# Patient Record
Sex: MALE | Race: WHITE | NOT HISPANIC OR LATINO | Employment: FULL TIME | URBAN - NONMETROPOLITAN AREA
[De-identification: names, ages, dates, MRNs, and addresses within clinical notes are randomized per-mention and may not be internally consistent; named-entity substitution may affect disease eponyms.]

---

## 2024-09-08 ENCOUNTER — HOSPITAL ENCOUNTER (EMERGENCY)
Facility: HOSPITAL | Age: 59
Discharge: HOME | End: 2024-09-08
Attending: FAMILY MEDICINE
Payer: COMMERCIAL

## 2024-09-08 ENCOUNTER — APPOINTMENT (OUTPATIENT)
Dept: RADIOLOGY | Facility: HOSPITAL | Age: 59
End: 2024-09-08
Payer: COMMERCIAL

## 2024-09-08 VITALS
HEART RATE: 89 BPM | TEMPERATURE: 98.4 F | WEIGHT: 225 LBS | HEIGHT: 72 IN | BODY MASS INDEX: 30.48 KG/M2 | SYSTOLIC BLOOD PRESSURE: 131 MMHG | OXYGEN SATURATION: 100 % | DIASTOLIC BLOOD PRESSURE: 86 MMHG | RESPIRATION RATE: 16 BRPM

## 2024-09-08 DIAGNOSIS — Z79.01 ON CONTINUOUS ORAL ANTICOAGULATION: ICD-10-CM

## 2024-09-08 DIAGNOSIS — S09.90XA INJURY OF HEAD, INITIAL ENCOUNTER: Primary | ICD-10-CM

## 2024-09-08 DIAGNOSIS — S01.81XA LACERATION OF FOREHEAD, INITIAL ENCOUNTER: ICD-10-CM

## 2024-09-08 PROCEDURE — 70450 CT HEAD/BRAIN W/O DYE: CPT | Performed by: RADIOLOGY

## 2024-09-08 PROCEDURE — 12011 RPR F/E/E/N/L/M 2.5 CM/<: CPT

## 2024-09-08 PROCEDURE — 99284 EMERGENCY DEPT VISIT MOD MDM: CPT | Mod: 25

## 2024-09-08 PROCEDURE — 70450 CT HEAD/BRAIN W/O DYE: CPT

## 2024-09-08 ASSESSMENT — PAIN DESCRIPTION - PROGRESSION: CLINICAL_PROGRESSION: NOT CHANGED

## 2024-09-08 ASSESSMENT — COLUMBIA-SUICIDE SEVERITY RATING SCALE - C-SSRS
1. IN THE PAST MONTH, HAVE YOU WISHED YOU WERE DEAD OR WISHED YOU COULD GO TO SLEEP AND NOT WAKE UP?: NO
6. HAVE YOU EVER DONE ANYTHING, STARTED TO DO ANYTHING, OR PREPARED TO DO ANYTHING TO END YOUR LIFE?: NO
2. HAVE YOU ACTUALLY HAD ANY THOUGHTS OF KILLING YOURSELF?: NO

## 2024-09-08 ASSESSMENT — PAIN SCALES - GENERAL: PAINLEVEL_OUTOF10: 2

## 2024-09-08 ASSESSMENT — PAIN - FUNCTIONAL ASSESSMENT: PAIN_FUNCTIONAL_ASSESSMENT: 0-10

## 2024-09-08 NOTE — DISCHARGE INSTRUCTIONS
As discussed CAT scan is negative for any acute intracranial bleeding.  Family to watch closely.  Tylenol for pain and ache.  Wound is superficial as well clean and closed with Dermabond and Steri-Strip.  If any problem concern return to ER.  Follow with primary care physician

## 2024-09-08 NOTE — ED PROVIDER NOTES
HPI   Chief Complaint   Patient presents with    Laceration       HPI  Patient who is on Plavix suffered head contusion and laceration, patient states that he was working on his 4 dugan and there was metal object behind his head, when he pushed it was going to fall.  He had arms back and due to the impact of pushing an object his head hit the solid metal object and then move forward and hit against a 4 dugan causing laceration forehead in the middle of eyebrow area obliquely.  He denies loss of conscious blackout or pass out dizziness lightheaded palpitation any neck pain chest pain or short of breath.  Since she is on blood thinner decided come to ER for evaluation as well as laceration evaluation.  His shots are up-to-date.  Patient has been on Plavix due to coronary artery disease.  Denies any difficulty moving arms or legs or any trouble keeping balance.  Denies any injury or discomfort in other part of body.  Denies any fever chills cough nausea vomiting diarrhea blood in stool black stool or vomiting blood.  Family history: Reviewed  Social history: Reviewed, denies substance abuse.  Review of system: 10 review of system obtained review of systems In HPI otherwise negative  Patient History   Past Medical History:   Diagnosis Date    Personal history of other diseases of the circulatory system 07/01/2014    History of hypertension     History reviewed. No pertinent surgical history.  No family history on file.  Social History     Tobacco Use    Smoking status: Never    Smokeless tobacco: Never   Substance Use Topics    Alcohol use: Not on file    Drug use: Not on file       Physical Exam   ED Triage Vitals   Temp Pulse Resp BP   -- -- -- --      SpO2 Temp src Heart Rate Source Patient Position   -- -- -- --      BP Location FiO2 (%)     -- --       Physical Exam  Constitutional:       Appearance: Normal appearance.      Comments: Patient was awake alert pleasant cooperative sitting next to his wife in the  examination room.  Noted oblique laceration forehead in between the eyebrow area it appeared to be superficial laceration mostly crusted scabs breakage of skin without any deeper laceration no bony depression step-off.  No periorbital edema or erythema or tenderness to bony step-off noted.  Pupils are equal round spine light accommodation extraocular motor function tact fundi is grossly within normal with no nystagmus noted.  Head was otherwise normocephalic atraumatic cervical thoracic and lumbar spine nontender chest wall nontender pelvic is stable.  Good motion arms and legs patient walking with steady gait.  No motor or sensory deficit no gait abnormality noted no arms or leg drift noted.  Patient walking with a steady gait.  No motor or sensory deficit noted patient walking with steady gait.  Spine nontender.  Cranial nerves II to XII grossly intact.    Chest wall nontender.  Lungs are clear no wheeze rales noted heart with regular rate and rhythm is auscultated abdomen soft nontender no guarding rebound rigidity.   HENT:      Head: Normocephalic and atraumatic.      Right Ear: External ear normal.      Left Ear: External ear normal.      Nose: Nose normal. No congestion or rhinorrhea.   Eyes:      Extraocular Movements: Extraocular movements intact.      Conjunctiva/sclera: Conjunctivae normal.      Pupils: Pupils are equal, round, and reactive to light.   Cardiovascular:      Rate and Rhythm: Normal rate and regular rhythm.      Pulses: Normal pulses.      Heart sounds: Normal heart sounds.   Pulmonary:      Effort: Pulmonary effort is normal.      Breath sounds: Normal breath sounds.   Abdominal:      General: Abdomen is flat. Bowel sounds are normal.      Palpations: Abdomen is soft.   Musculoskeletal:      Cervical back: Normal range of motion and neck supple.   Skin:     General: Skin is warm.      Capillary Refill: Capillary refill takes less than 2 seconds.   Neurological:      General: No focal deficit  present.      Mental Status: He is alert and oriented to person, place, and time.   Psychiatric:         Mood and Affect: Mood normal.         Behavior: Behavior normal.           ED Course & MDM   Diagnoses as of 09/11/24 0857   Injury of head, initial encounter   On continuous oral anticoagulation   Laceration of forehead, initial encounter   Patient who is on Plavix suffered head contusion and laceration, patient states that he was working on his 4 dugan and there was metal object behind his head, when he pushed it was going to fall.  He had arms back and due to the impact of pushing an object his head hit the solid metal object and then move forward and hit against a 4 dugan causing laceration forehead in the middle of eyebrow area obliquely.  He denies loss of conscious blackout or pass out dizziness lightheaded palpitation any neck pain chest pain or short of breath.  Since she is on blood thinner decided come to ER for evaluation as well as laceration evaluation.  His shots are up-to-date.  Patient has been on Plavix due to coronary artery disease.  Denies any difficulty moving arms or legs or any trouble keeping balance.  Denies any injury or discomfort in other part of body.  Denies any fever chills cough nausea vomiting diarrhea blood in stool black stool or vomiting blood.  Due to            Patient was awake alert pleasant cooperative sitting next to his wife in the examination room.  Noted oblique laceration forehead in between the eyebrow area it appeared to be superficial laceration mostly crusted scabs breakage of skin without any deeper laceration no bony depression step-off.  No periorbital edema or erythema or tenderness to bony step-off noted.  Pupils are equal round spine light accommodation extraocular motor function tact fundi is grossly within normal with no nystagmus noted.  Head was otherwise normocephalic atraumatic cervical thoracic and lumbar spine nontender chest wall nontender  pelvic is stable.  Good motion arms and legs patient walking with steady gait.  No motor or sensory deficit no gait abnormality noted no arms or leg drift noted.  Patient walking with a steady gait.  No motor or sensory deficit noted patient walking with steady gait.  Spine nontender.  Cranial nerves II to XII grossly intact.    Chest wall nontender.  Lungs are clear no wheeze rales noted heart with regular rate and rhythm is auscultated abdomen soft nontender no guarding rebound rigidity.  No data recorded     Heidi Coma Scale Score: 15 (09/08/24 1740 : Anita Torres RN)                         Medical Decision Making  Due to patient presenting symptom head injury while on blood thinner forehead laceration CT of the head was obtained which showed no acute intracranial abnormality.  There was no tenderness upon palpation cervical thoracic or lumbar spine.  Left forehead oblique laceration was superficial as result it was thoroughly cleaned prepped and draped and using Dermabond wound was closed actually wound was approximated as wound was already closed showed only superficial injury.  I also applied 2 Steri-Strips to further augment the approximation wound edges as if there was required no suture repair.  Patient tolerated procedure well with no complications.  Discharged home with close follow-up.    Partial-thickness laceration length 1.5 cm.      Procedures     Karla Dumont MD  09/11/24 0914       Karla uDmont MD  09/21/24 0019